# Patient Record
Sex: MALE
[De-identification: names, ages, dates, MRNs, and addresses within clinical notes are randomized per-mention and may not be internally consistent; named-entity substitution may affect disease eponyms.]

---

## 2021-04-23 ENCOUNTER — NURSE TRIAGE (OUTPATIENT)
Dept: OTHER | Facility: CLINIC | Age: 66
End: 2021-04-23

## 2021-04-23 NOTE — TELEPHONE ENCOUNTER
Reason for Disposition   SEVERE abdominal pain    Answer Assessment - Initial Assessment Questions  1. ONSET:  \"When did this first appear? \"      \"Right sided lower hernia, feels like it is going lower and lower and I have had for about 1 year and a couple months. \"     2. APPEARANCE: \"What does it look like? \"      Bulging out and dropping down more toward testiles. 3. SIZE: \"How big is it? \" (inches, cm or compare to coins, fruit)         Hanging down about 5 inches down like size of pear. 4. LOCATION: \"Where exactly is the hernia located? \"      Lower right side, dropping down toward testicles. 5. PATTERN: \"Does the swelling come and go, or has it been constant since it started? \"      Swelling is bigger and dropping down. Doesn't come and go. Tingling and burning - stomach is swollen - feels a few months pregnant. 6. PAIN: \"Is there any pain? \" If so, ask: \"How bad is it? \"  (Scale 1-10; or mild, moderate, severe)      Has tried to take Ibuprofen, can't sleep. Walking bent over. Rate 8/10    7. DIAGNOSIS: \"Have you been seen by a doctor for this? \" \"Did the doctor diagnose you as having a hernia? \"      Yes - has had a repair on the left side in 2019.    8. OTHER SYMPTOMS: \"Do you have any other symptoms? \" (e.g., fever, abdominal pain, vomiting)      Abdominal pain, not eating today. 9. PREGNANCY: \"Is there any chance you are pregnant? \" \"When was your last menstrual period? \"     NA    Protocols used: HERNIA-ADULT-    Does not have a PCP currently. Needs to establish care. Brief description of triage: Left inquinal hernia, burning, tenderness, not eating, swelling. Triage indicates for patient to be seen in the ED. Care advice provided, patient verbalizes understanding; denies any other questions or concerns; instructed to call back for any new or worsening symptoms. This triage is a result of a call to [address] [place] [address].  Please do not respond to the triage nurse through this encounter. Any subsequent communication should be directly with the patient. Provided a number for GI doctor at [facility] to establish care.